# Patient Record
Sex: MALE | Race: WHITE | Employment: FULL TIME | URBAN - METROPOLITAN AREA
[De-identification: names, ages, dates, MRNs, and addresses within clinical notes are randomized per-mention and may not be internally consistent; named-entity substitution may affect disease eponyms.]

---

## 2024-02-19 ENCOUNTER — OFFICE VISIT (OUTPATIENT)
Dept: ENT CLINIC | Age: 64
End: 2024-02-19

## 2024-02-19 VITALS — WEIGHT: 180.6 LBS | HEART RATE: 64 BPM | HEIGHT: 64 IN | OXYGEN SATURATION: 100 % | BODY MASS INDEX: 30.83 KG/M2

## 2024-02-19 DIAGNOSIS — J34.0 ABSCESS, FURUNCLE AND CARBUNCLE OF NOSE: Primary | ICD-10-CM

## 2024-02-19 PROCEDURE — 31231 NASAL ENDOSCOPY DX: CPT | Performed by: PHYSICIAN ASSISTANT

## 2024-02-19 PROCEDURE — 99203 OFFICE O/P NEW LOW 30 MIN: CPT | Performed by: PHYSICIAN ASSISTANT

## 2024-02-19 RX ORDER — FLUTICASONE PROPIONATE 50 MCG
2 SPRAY, SUSPENSION (ML) NASAL DAILY
COMMUNITY
Start: 2016-04-22

## 2024-02-19 RX ORDER — FLUTICASONE PROPIONATE AND SALMETEROL XINAFOATE 230; 21 UG/1; UG/1
2 AEROSOL, METERED RESPIRATORY (INHALATION) 2 TIMES DAILY
COMMUNITY
Start: 2016-06-23

## 2024-02-19 RX ORDER — ALBUTEROL SULFATE 90 UG/1
1 AEROSOL, METERED RESPIRATORY (INHALATION) EVERY 6 HOURS PRN
COMMUNITY
Start: 2016-04-22

## 2024-02-19 RX ORDER — CETIRIZINE HYDROCHLORIDE 10 MG/1
1 TABLET ORAL DAILY
COMMUNITY
Start: 2017-05-04

## 2024-02-19 ASSESSMENT — ENCOUNTER SYMPTOMS
GASTROINTESTINAL NEGATIVE: 1
RESPIRATORY NEGATIVE: 1
EYES NEGATIVE: 1
ALLERGIC/IMMUNOLOGIC NEGATIVE: 1

## 2024-02-19 NOTE — PROGRESS NOTES
Alex Joy is a 63 y.o. male presents today with c/o 3 weeks of swelling and pain in the nose.  It caused pain and swelling on the outside of his nose in the front of his nose down to his lip and to underneath his eye particularly on the left side.  He was treated with doxycycline and it did not completely resolved.  So he was switched to Augmentin which she is currently on.  The patient showed me a photograph that showed diffusely enlarged nasal tip extending over both alar cartilage but columella looks appropriate and no facial swelling.  The patient notes it is greatly improved but not completely back to normal he fell earlier today there was some pus draining from it out the front of his nose that would get down on his close.    Chief Complaint   Patient presents with    New Patient     Abscess in nose.  Patient states he has had the abscess for about 3 weeks.  Patient is currently on one abx (augmentin) patient has finished doxycyline.  Patient has picture to show how inflamed his nose was.  Patient does not have the same amount of tenderness or pain.  Patient states the abscess in internal on the left nostril.         Patient Active Problem List   Diagnosis    Abnormal PFTs    Benign non-nodular prostatic hyperplasia without lower urinary tract symptoms    Allergic rhinitis due to pollen    WEISS (dyspnea on exertion)    Mild intermittent asthma without complication        Reviewed and updated this visit by provider:  Tobacco  Allergies  Meds  Problems  Med Hx  Surg Hx  Fam Hx         Review of Systems   Constitutional: Negative.    HENT: Negative.          Nasal abscess   Eyes: Negative.    Respiratory: Negative.     Cardiovascular: Negative.    Gastrointestinal: Negative.    Endocrine: Negative.    Genitourinary: Negative.    Musculoskeletal: Negative.    Skin: Negative.    Allergic/Immunologic: Negative.    Neurological: Negative.    Hematological: Negative.    Psychiatric/Behavioral: Negative.

## 2024-03-06 ENCOUNTER — OFFICE VISIT (OUTPATIENT)
Dept: ENT CLINIC | Age: 64
End: 2024-03-06

## 2024-03-06 VITALS — OXYGEN SATURATION: 100 % | WEIGHT: 180 LBS | HEIGHT: 64 IN | HEART RATE: 78 BPM | BODY MASS INDEX: 30.73 KG/M2

## 2024-03-06 DIAGNOSIS — R09.82 PND (POST-NASAL DRIP): Chronic | ICD-10-CM

## 2024-03-06 DIAGNOSIS — J34.0 ABSCESS, FURUNCLE AND CARBUNCLE OF NOSE: Primary | Chronic | ICD-10-CM

## 2024-03-06 PROCEDURE — 99214 OFFICE O/P EST MOD 30 MIN: CPT | Performed by: PHYSICIAN ASSISTANT

## 2024-03-06 ASSESSMENT — ENCOUNTER SYMPTOMS
ALLERGIC/IMMUNOLOGIC NEGATIVE: 1
EYES NEGATIVE: 1
GASTROINTESTINAL NEGATIVE: 1
RESPIRATORY NEGATIVE: 1

## 2024-03-06 NOTE — PROGRESS NOTES
Alex Joy is a 63 y.o. male presents today with c/o left nasal vestibulitis/ abscess. Pt can still feel the bump in the left nostril, and sometimes gets a white sticky drainage that doesn't look like pus. He's not having any pain, swelling, or increased redness.     Chief Complaint   Patient presents with    Other     Abscess in nose.  Patient states he is doing better than last time, but he can still feel a bump in his nostril still.  When he get in there to clean the abscess there is some drainage that it a white color but he doesn't believe it is pus.  No pain currently.         Patient Active Problem List   Diagnosis    Abnormal PFTs    Benign non-nodular prostatic hyperplasia without lower urinary tract symptoms    Allergic rhinitis due to pollen    WEISS (dyspnea on exertion)    Mild intermittent asthma without complication        Reviewed and updated this visit by provider:  Tobacco  Allergies  Meds  Problems  Med Hx  Surg Hx  Fam Hx         Review of Systems   Constitutional: Negative.    HENT: Negative.          Abscess   Eyes: Negative.    Respiratory: Negative.     Cardiovascular: Negative.    Gastrointestinal: Negative.    Endocrine: Negative.    Genitourinary: Negative.    Musculoskeletal: Negative.    Skin: Negative.    Allergic/Immunologic: Negative.    Neurological: Negative.    Hematological: Negative.    Psychiatric/Behavioral: Negative.          Pulse 78   Ht 1.626 m (5' 4\")   Wt 81.6 kg (180 lb)   SpO2 100%   BMI 30.90 kg/m²     Physical Exam:    General: Well developed, well nourished, in no acute distress  Communication: The patient communicates appropriately for their age.  Voice: Normal.  Head, Face, and Salivary Glands: No head or facial abnormalities present, No masses or lesions present, Overall appearance is normal, No abnormality of parotid or submandibular glands present.    Nose/Nasal Cavity: Nasal mucosa is pink/ moist.  Nasal septum is midline.  Inferior turbinates are